# Patient Record
Sex: MALE | Race: WHITE | NOT HISPANIC OR LATINO | URBAN - METROPOLITAN AREA
[De-identification: names, ages, dates, MRNs, and addresses within clinical notes are randomized per-mention and may not be internally consistent; named-entity substitution may affect disease eponyms.]

---

## 2023-08-13 ENCOUNTER — EMERGENCY (EMERGENCY)
Facility: HOSPITAL | Age: 58
LOS: 1 days | Discharge: HOME | End: 2023-08-13
Admitting: EMERGENCY MEDICINE
Payer: MEDICAID

## 2023-08-13 VITALS
WEIGHT: 169.98 LBS | OXYGEN SATURATION: 99 % | SYSTOLIC BLOOD PRESSURE: 146 MMHG | HEART RATE: 75 BPM | RESPIRATION RATE: 16 BRPM | HEIGHT: 67 IN | TEMPERATURE: 99 F | DIASTOLIC BLOOD PRESSURE: 91 MMHG

## 2023-08-13 DIAGNOSIS — M79.641 PAIN IN RIGHT HAND: ICD-10-CM
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
## 2023-08-13 DIAGNOSIS — Z79.4 LONG TERM (CURRENT) USE OF INSULIN: ICD-10-CM

## 2023-08-13 DIAGNOSIS — E11.9 TYPE 2 DIABETES MELLITUS WITHOUT COMPLICATIONS: ICD-10-CM
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
## 2023-08-13 PROCEDURE — 99284 EMERGENCY DEPT VISIT MOD MDM: CPT

## 2023-08-13 PROCEDURE — 99283 EMERGENCY DEPT VISIT LOW MDM: CPT | Mod: 25

## 2023-08-13 PROCEDURE — 73130 X-RAY EXAM OF HAND: CPT

## 2023-08-13 PROCEDURE — 73130 X-RAY EXAM OF HAND: CPT | Mod: 26,LT

## 2023-08-13 NOTE — ED ADULT NURSE NOTE - OBJECTIVE STATEMENT
58y M c/o L hand pain/injury. Pt reports he was playing basketball and a basketball hit his hand. Redness and swelling to L 3rd digit knuckle. No abrasion, bleeding, obvious physical injury. Endorses severe pain to site. Denies numbness/tingling. Sensory intact, pt able to move fingers. Pt AAOx4, speaking in full and clear sentences, NAD at this time.

## 2023-08-13 NOTE — ED ADULT NURSE NOTE - NSFALLUNIVINTERV_ED_ALL_ED
Bed/Stretcher in lowest position, wheels locked, appropriate side rails in place/Call bell, personal items and telephone in reach/Instruct patient to call for assistance before getting out of bed/chair/stretcher/Non-slip footwear applied when patient is off stretcher/[place] to call system/Physically safe environment - no spills, clutter or unnecessary equipment/Purposeful proactive rounding/Room/bathroom lighting operational, light cord in reach

## 2023-08-13 NOTE — ED PROVIDER NOTE - NSFOLLOWUPINSTRUCTIONS_ED_ALL_ED_FT
[phone]    Hand Pain  Many things can cause hand pain. Some common causes are:  An injury.  Repeating the same movement with your hand over and over (overuse).  Osteoporosis.  Arthritis.  Lumps in the tendons or joints of the hand and wrist (ganglion cysts).  Nerve compression syndromes (carpal tunnel syndrome).  Inflammation of the tendons (tendinitis).  Infection.  Follow these instructions at home:  Pay attention to any changes in your symptoms. Take these actions to help with your discomfort:    Managing pain, stiffness, and swelling    A bag of ice on a towel on the skin.  Take over-the-counter and prescription medicines only as told by your health care provider.  Wear a hand splint or support as told by your health care provider.  If directed, put ice on the affected area:  Put ice in a plastic bag.  Place a towel between your skin and the bag.  Leave the ice on for 20 minutes, 2–3 times a day.  Activity    Take breaks from repetitive activity often.  Avoid activities that make your pain worse.  Minimize stress on your hands and wrists as much as possible.  Do stretches or exercises as told by your health care provider.  Do not do activities that make your pain worse.  Contact a health care provider if:  Your pain does not get better after a few days of self-care.  Your pain gets worse.  Your pain affects your ability to do your daily activities.  Get help right away if:  Your hand becomes warm, red, or swollen.  Your hand is numb or tingling.  Your hand is extremely swollen or deformed.  Your hand or fingers turn white or blue.  You cannot move your hand, wrist, or fingers.  Summary  Many things can cause hand pain.  Contact your health care provider if your pain does not get better after a few days of self care.  Minimize stress on your hands and wrists as much as possible.  Do not do activities that make your pain worse.  This information is not intended to replace advice given to you by your health care provider. Make sure you discuss any questions you have with your health care provider.

## 2023-08-13 NOTE — ED PROVIDER NOTE - MUSCULOSKELETAL, MLM
left hand +tenderness and mild swelling to 3rd MCPJ on dorsum of hand, +FROM x 5 digits, strength 5/5, sensation intact distally

## 2023-08-13 NOTE — ED ADULT TRIAGE NOTE - OTHER COMPLAINTS
pt also requesting refill of his Lantus insulin. stated " he has an issue with the insurance and has not been approved"

## 2023-08-13 NOTE — ED PROVIDER NOTE - OBJECTIVE STATEMENT
59 y/o m hx DM presents c/o right hand pain.  Pt stating the ball hit his hand earlier today while playing basketball.  Pt also stating he will run out of lantus in the next few days.  Pt stating he has medicaid and lantus isn't covered, had been getting it "from outside the [place]" and will run out soon and asking for a vial from the hospital.

## 2023-08-13 NOTE — ED PROVIDER NOTE - CLINICAL SUMMARY MEDICAL DECISION MAKING FREE TEXT BOX
59 y/o m hx DM presents c/o left hand pain after a basketball hit it today.  Ext NVI, xr neg, pt declined pain meds, will recommend ice, NSAIDs PRN pain.  Pt also asking for lantus to be given to him, stating his insurance won't cover it unless there is a prior authorization.  It was explained to patient there isn't insulin to give him to take home which is what he wants, pt not wanting a prescription.  Will give pt info for , also will have referral coordinator reach out to expedite follow up to endocrine, pt advised to f/u with pmd also

## 2023-08-13 NOTE — ED PROVIDER NOTE - PATIENT PORTAL LINK FT
You can access the FollowMyHealth Patient Portal offered by [facility] by registering at the following website: [email or web address]. By joining [email or web address]’s FollowMyHealth portal, you will also be able to view your health information using other applications (apps) compatible with our system.

## 2023-08-14 ENCOUNTER — EMERGENCY (EMERGENCY)
Facility: HOSPITAL | Age: 58
LOS: 1 days | Discharge: HOME | End: 2023-08-14
Admitting: STUDENT IN AN ORGANIZED HEALTH CARE EDUCATION/TRAINING PROGRAM
Payer: MEDICAID

## 2023-08-14 VITALS
SYSTOLIC BLOOD PRESSURE: 162 MMHG | HEART RATE: 66 BPM | OXYGEN SATURATION: 99 % | HEIGHT: 67 IN | WEIGHT: 169.98 LBS | RESPIRATION RATE: 17 BRPM | TEMPERATURE: 98 F | DIASTOLIC BLOOD PRESSURE: 93 MMHG

## 2023-08-14 PROCEDURE — 99283 EMERGENCY DEPT VISIT LOW MDM: CPT

## 2023-08-14 PROCEDURE — 99281 EMR DPT VST MAYX REQ PHY/QHP: CPT

## 2023-08-14 RX ORDER — INSULIN GLARGINE 100 [IU]/ML
9 INJECTION, SOLUTION SUBCUTANEOUS
Qty: 10 | Refills: 0
Start: 2023-08-14 | End: 2023-09-12

## 2023-08-14 NOTE — ED PROVIDER NOTE - NSFOLLOWUPINSTRUCTIONS_ED_ALL_ED_FT
Please follow up with your primary care doctor in 24-48 hours. Return to ED for any worsening or emergent symptoms.

## 2023-08-14 NOTE — ED ADULT TRIAGE NOTE - CHIEF COMPLAINT QUOTE
"I need insulin and I am a type one diabetic and my doctor can't see me yet but I only have till tomorrow for my lantus".

## 2023-08-14 NOTE — ED PROVIDER NOTE - PATIENT PORTAL LINK FT
You can access the FollowMyHealth Patient Portal offered by [facility] by registering at the following website: [email or web address]. By joining [email or web address] FollowMyHealth portal, you will also be able to view your health information using other applications (apps) compatible with our system.

## 2023-08-14 NOTE — ED PROVIDER NOTE - CLINICAL SUMMARY MEDICAL DECISION MAKING FREE TEXT BOX
58-year-old male with past medical history of diabetes requesting a refill of Lantus because he is got a run out today.  Patient has been trying to get into see his doctor but they do not have an appointment.  Patient's medication usually requires preauthorization but he called the insurance company and states they will fill 1 prescription without preauthorization.  Patient takes 9 units of Lantus in the morning in the evening.  Otherwise offers no complaints. PE unremarkable. lantus sent to pharmacy

## 2023-08-14 NOTE — ED PROVIDER NOTE - OBJECTIVE STATEMENT
58-year-old male with past medical history of diabetes requesting a refill of Lantus because he is got a run out today.  Patient has been trying to get into see his doctor but they do not have an appointment.  Patient's medication usually requires preauthorization but he called the insurance company and states they will fill 1 prescription without preauthorization.  Patient takes 9 units of Lantus in the morning in the evening.  Otherwise offers no complaints.

## 2023-08-14 NOTE — ED PROVIDER NOTE - DISPOSITION TYPE
Patient is aware of overdue screening and will call on own time for scheduling. Thanks     DISCHARGE

## 2023-08-14 NOTE — ED PROVIDER NOTE - PHYSICAL EXAMINATION
CONSTITUTIONAL: Well-appearing;  in no apparent distress.   HEAD: Normocephalic; atraumatic.   EYES: PERRL; EOM intact; conjunctiva and sclera clear  ENT: normal nose; no rhinorrhea; normal pharynx with no erythema or lesions.

## 2023-08-14 NOTE — ED ADULT NURSE NOTE - OBJECTIVE STATEMENT
58 yoM came to ED for a medication refill of his lantus medication. Pt states he only has one left but was unable to make an appointment with his doctor. Denies chest pain, SOB, n/v/d

## 2023-08-14 NOTE — ED ADULT NURSE NOTE - NSFALLUNIVINTERV_ED_ALL_ED
Bed/Stretcher in lowest position, wheels locked, appropriate side rails in place/Call bell, personal items and telephone in reach/Instruct patient to call for assistance before getting out of bed/chair/stretcher/Non-slip footwear applied when patient is off stretcher/[place] to call system/Physically safe environment - no spills, clutter or unnecessary equipment/Purposeful proactive rounding/Room/bathroom lighting operational, light cord in reach

## 2023-08-17 DIAGNOSIS — Z76.0 ENCOUNTER FOR ISSUE OF REPEAT PRESCRIPTION: ICD-10-CM

## 2023-08-17 DIAGNOSIS — E11.9 TYPE 2 DIABETES MELLITUS WITHOUT COMPLICATIONS: ICD-10-CM

## 2023-08-17 DIAGNOSIS — Z79.4 LONG TERM (CURRENT) USE OF INSULIN: ICD-10-CM
